# Patient Record
Sex: FEMALE | Employment: UNEMPLOYED | URBAN - METROPOLITAN AREA
[De-identification: names, ages, dates, MRNs, and addresses within clinical notes are randomized per-mention and may not be internally consistent; named-entity substitution may affect disease eponyms.]

---

## 2019-04-29 LAB
HBV SURFACE AG SERPL QL IA: NORMAL
HIV 1+2 AB+HIV1 P24 AG SERPL QL IA: NORMAL
RUBELLA ANTIBODY IGG QUANTITATIVE: NORMAL IU/ML

## 2019-10-11 LAB — GROUP B STREP PCR: NEGATIVE

## 2019-10-30 ENCOUNTER — ANESTHESIA (OUTPATIENT)
Dept: OBGYN | Facility: CLINIC | Age: 34
End: 2019-10-30
Payer: COMMERCIAL

## 2019-10-30 ENCOUNTER — HOSPITAL ENCOUNTER (INPATIENT)
Facility: CLINIC | Age: 34
LOS: 2 days | Discharge: HOME | End: 2019-11-01
Attending: OBSTETRICS & GYNECOLOGY | Admitting: OBSTETRICS & GYNECOLOGY
Payer: COMMERCIAL

## 2019-10-30 ENCOUNTER — ANESTHESIA EVENT (OUTPATIENT)
Dept: OBGYN | Facility: CLINIC | Age: 34
End: 2019-10-30
Payer: COMMERCIAL

## 2019-10-30 PROBLEM — Z36.89 ENCOUNTER FOR TRIAGE IN PREGNANT PATIENT: Status: ACTIVE | Noted: 2019-10-30

## 2019-10-30 LAB
ABO + RH BLD: ABNORMAL
BLD GP AB INVEST PLASRBC-IMP: ABNORMAL
BLD GP AB SCN SERPL QL: ABNORMAL
BLD GP AB SCN SERPL QL: ABNORMAL
BLD PROD TYP BPU: ABNORMAL
BLOOD BANK CMNT PATIENT-IMP: ABNORMAL
HGB BLD-MCNC: 11 G/DL (ref 11.7–15.7)
NUM BPU REQUESTED: 2
RUPTURE OF FETAL MEMBRANES BY ROM PLUS: POSITIVE
SPECIMEN EXP DATE BLD: ABNORMAL

## 2019-10-30 PROCEDURE — 0KQM0ZZ REPAIR PERINEUM MUSCLE, OPEN APPROACH: ICD-10-PCS | Performed by: OBSTETRICS & GYNECOLOGY

## 2019-10-30 PROCEDURE — 36415 COLL VENOUS BLD VENIPUNCTURE: CPT | Performed by: OBSTETRICS & GYNECOLOGY

## 2019-10-30 PROCEDURE — 86901 BLOOD TYPING SEROLOGIC RH(D): CPT | Performed by: OBSTETRICS & GYNECOLOGY

## 2019-10-30 PROCEDURE — 25000128 H RX IP 250 OP 636: Performed by: ANESTHESIOLOGY

## 2019-10-30 PROCEDURE — 12000000 ZZH R&B MED SURG/OB

## 2019-10-30 PROCEDURE — 72200001 ZZH LABOR CARE VAGINAL DELIVERY SINGLE

## 2019-10-30 PROCEDURE — 86780 TREPONEMA PALLIDUM: CPT | Performed by: OBSTETRICS & GYNECOLOGY

## 2019-10-30 PROCEDURE — 37000011 ZZH ANESTHESIA WARD SERVICE

## 2019-10-30 PROCEDURE — 25800030 ZZH RX IP 258 OP 636: Performed by: ANESTHESIOLOGY

## 2019-10-30 PROCEDURE — 86900 BLOOD TYPING SEROLOGIC ABO: CPT | Performed by: OBSTETRICS & GYNECOLOGY

## 2019-10-30 PROCEDURE — 86850 RBC ANTIBODY SCREEN: CPT | Performed by: OBSTETRICS & GYNECOLOGY

## 2019-10-30 PROCEDURE — 86922 COMPATIBILITY TEST ANTIGLOB: CPT | Performed by: OBSTETRICS & GYNECOLOGY

## 2019-10-30 PROCEDURE — 84112 EVAL AMNIOTIC FLUID PROTEIN: CPT | Performed by: OBSTETRICS & GYNECOLOGY

## 2019-10-30 PROCEDURE — G0463 HOSPITAL OUTPT CLINIC VISIT: HCPCS

## 2019-10-30 PROCEDURE — 3E0R3BZ INTRODUCTION OF ANESTHETIC AGENT INTO SPINAL CANAL, PERCUTANEOUS APPROACH: ICD-10-PCS | Performed by: ANESTHESIOLOGY

## 2019-10-30 PROCEDURE — 85018 HEMOGLOBIN: CPT | Performed by: OBSTETRICS & GYNECOLOGY

## 2019-10-30 PROCEDURE — 00HU33Z INSERTION OF INFUSION DEVICE INTO SPINAL CANAL, PERCUTANEOUS APPROACH: ICD-10-PCS | Performed by: ANESTHESIOLOGY

## 2019-10-30 PROCEDURE — 25800030 ZZH RX IP 258 OP 636: Performed by: OBSTETRICS & GYNECOLOGY

## 2019-10-30 PROCEDURE — 0UQJXZZ REPAIR CLITORIS, EXTERNAL APPROACH: ICD-10-PCS | Performed by: OBSTETRICS & GYNECOLOGY

## 2019-10-30 PROCEDURE — 40000671 ZZH STATISTIC ANESTHESIA CASE

## 2019-10-30 PROCEDURE — 25000125 ZZHC RX 250: Performed by: OBSTETRICS & GYNECOLOGY

## 2019-10-30 PROCEDURE — 86870 RBC ANTIBODY IDENTIFICATION: CPT | Performed by: OBSTETRICS & GYNECOLOGY

## 2019-10-30 RX ORDER — FERROUS SULFATE 325(65) MG
325 TABLET, DELAYED RELEASE (ENTERIC COATED) ORAL DAILY
Status: ON HOLD | COMMUNITY
End: 2019-11-01

## 2019-10-30 RX ORDER — METHYLERGONOVINE MALEATE 0.2 MG/ML
200 INJECTION INTRAVENOUS
Status: DISCONTINUED | OUTPATIENT
Start: 2019-10-30 | End: 2019-10-31

## 2019-10-30 RX ORDER — PRENATAL VIT/IRON FUM/FOLIC AC 27MG-0.8MG
1 TABLET ORAL DAILY
COMMUNITY

## 2019-10-30 RX ORDER — NALOXONE HYDROCHLORIDE 0.4 MG/ML
.1-.4 INJECTION, SOLUTION INTRAMUSCULAR; INTRAVENOUS; SUBCUTANEOUS
Status: DISCONTINUED | OUTPATIENT
Start: 2019-10-30 | End: 2019-10-31

## 2019-10-30 RX ORDER — NALBUPHINE HYDROCHLORIDE 10 MG/ML
2.5-5 INJECTION, SOLUTION INTRAMUSCULAR; INTRAVENOUS; SUBCUTANEOUS EVERY 6 HOURS PRN
Status: DISCONTINUED | OUTPATIENT
Start: 2019-10-30 | End: 2019-10-31

## 2019-10-30 RX ORDER — FENTANYL CITRATE 50 UG/ML
50-100 INJECTION, SOLUTION INTRAMUSCULAR; INTRAVENOUS
Status: DISCONTINUED | OUTPATIENT
Start: 2019-10-30 | End: 2019-10-31

## 2019-10-30 RX ORDER — PENICILLIN G POTASSIUM 5000000 [IU]/1
5 INJECTION, POWDER, FOR SOLUTION INTRAMUSCULAR; INTRAVENOUS ONCE
Status: DISCONTINUED | OUTPATIENT
Start: 2019-10-30 | End: 2019-10-30

## 2019-10-30 RX ORDER — ONDANSETRON 2 MG/ML
4 INJECTION INTRAMUSCULAR; INTRAVENOUS EVERY 6 HOURS PRN
Status: DISCONTINUED | OUTPATIENT
Start: 2019-10-30 | End: 2019-10-31

## 2019-10-30 RX ORDER — CARBOPROST TROMETHAMINE 250 UG/ML
250 INJECTION, SOLUTION INTRAMUSCULAR
Status: DISCONTINUED | OUTPATIENT
Start: 2019-10-30 | End: 2019-10-31

## 2019-10-30 RX ORDER — OXYTOCIN/0.9 % SODIUM CHLORIDE 30/500 ML
1-24 PLASTIC BAG, INJECTION (ML) INTRAVENOUS CONTINUOUS
Status: DISCONTINUED | OUTPATIENT
Start: 2019-10-30 | End: 2019-10-31

## 2019-10-30 RX ORDER — ACETAMINOPHEN 325 MG/1
650 TABLET ORAL EVERY 4 HOURS PRN
Status: DISCONTINUED | OUTPATIENT
Start: 2019-10-30 | End: 2019-10-31

## 2019-10-30 RX ORDER — IBUPROFEN 800 MG/1
800 TABLET, FILM COATED ORAL
Status: DISCONTINUED | OUTPATIENT
Start: 2019-10-30 | End: 2019-10-31

## 2019-10-30 RX ORDER — LIDOCAINE 40 MG/G
CREAM TOPICAL
Status: DISCONTINUED | OUTPATIENT
Start: 2019-10-30 | End: 2019-10-31

## 2019-10-30 RX ORDER — SODIUM CHLORIDE, SODIUM LACTATE, POTASSIUM CHLORIDE, CALCIUM CHLORIDE 600; 310; 30; 20 MG/100ML; MG/100ML; MG/100ML; MG/100ML
INJECTION, SOLUTION INTRAVENOUS CONTINUOUS
Status: DISCONTINUED | OUTPATIENT
Start: 2019-10-30 | End: 2019-10-31

## 2019-10-30 RX ORDER — OXYTOCIN/0.9 % SODIUM CHLORIDE 30/500 ML
100-340 PLASTIC BAG, INJECTION (ML) INTRAVENOUS CONTINUOUS PRN
Status: DISCONTINUED | OUTPATIENT
Start: 2019-10-30 | End: 2019-10-31

## 2019-10-30 RX ORDER — OXYTOCIN 10 [USP'U]/ML
10 INJECTION, SOLUTION INTRAMUSCULAR; INTRAVENOUS
Status: DISCONTINUED | OUTPATIENT
Start: 2019-10-30 | End: 2019-10-31

## 2019-10-30 RX ORDER — EPHEDRINE SULFATE 50 MG/ML
5 INJECTION, SOLUTION INTRAMUSCULAR; INTRAVENOUS; SUBCUTANEOUS
Status: DISCONTINUED | OUTPATIENT
Start: 2019-10-30 | End: 2019-10-31

## 2019-10-30 RX ORDER — OXYCODONE AND ACETAMINOPHEN 5; 325 MG/1; MG/1
1 TABLET ORAL
Status: DISCONTINUED | OUTPATIENT
Start: 2019-10-30 | End: 2019-10-31

## 2019-10-30 RX ADMIN — SODIUM CHLORIDE, POTASSIUM CHLORIDE, SODIUM LACTATE AND CALCIUM CHLORIDE: 600; 310; 30; 20 INJECTION, SOLUTION INTRAVENOUS at 18:57

## 2019-10-30 RX ADMIN — Medication 2 MILLI-UNITS/MIN: at 18:57

## 2019-10-30 RX ADMIN — FENTANYL CITRATE: 50 INJECTION INTRAVENOUS at 20:52

## 2019-10-30 SDOH — HEALTH STABILITY: MENTAL HEALTH: HOW OFTEN DO YOU HAVE A DRINK CONTAINING ALCOHOL?: NEVER

## 2019-10-30 ASSESSMENT — MIFFLIN-ST. JEOR: SCORE: 1281.95

## 2019-10-30 NOTE — PLAN OF CARE
Dr Hernandez updated of patient arrival--see previous note. FHTs category I with repositioning, no additional contractions noted. Orders to await ROM+ result, will encourage oral hydration. Will continue to monitor and update as needed.

## 2019-10-30 NOTE — PROVIDER NOTIFICATION
10/30/19 1732   Provider Notification   Provider Name/Title Dr Hernandez   Method of Notification Phone   Request Evaluate - Remote   Notification Reason Lab/Diagnostic Study;Status Update;SVE   ROM+ Positive, GBS negative. Prolonged SROM 10/29/19 @ 2100--clear fluid. SVE 2.5/70/-1 with [facility] 8. TORB for intrapartum orders with PCN for GBS treatment and pitocin augmentation. FHTs now Category I. Will continue to monitor and update as needed,

## 2019-10-31 LAB
BLD PROD TYP BPU: NORMAL
BLD UNIT ID BPU: 0
BLOOD PRODUCT CODE: NORMAL
BPU ID: NORMAL
HGB BLD-MCNC: 9.9 G/DL (ref 11.7–15.7)
T PALLIDUM AB SER QL: NONREACTIVE
TRANSFUSION STATUS PATIENT QL: NORMAL
TRANSFUSION STATUS PATIENT QL: NORMAL

## 2019-10-31 PROCEDURE — 36415 COLL VENOUS BLD VENIPUNCTURE: CPT | Performed by: OBSTETRICS & GYNECOLOGY

## 2019-10-31 PROCEDURE — 40000084 ZZH STATISTIC IP LACTATION SERVICES 16-30 MIN

## 2019-10-31 PROCEDURE — 85018 HEMOGLOBIN: CPT | Performed by: OBSTETRICS & GYNECOLOGY

## 2019-10-31 PROCEDURE — 12000000 ZZH R&B MED SURG/OB

## 2019-10-31 PROCEDURE — 25000132 ZZH RX MED GY IP 250 OP 250 PS 637: Performed by: OBSTETRICS & GYNECOLOGY

## 2019-10-31 RX ORDER — IBUPROFEN 800 MG/1
800 TABLET, FILM COATED ORAL EVERY 6 HOURS PRN
Status: DISCONTINUED | OUTPATIENT
Start: 2019-10-31 | End: 2019-11-01 | Stop reason: HOSPADM

## 2019-10-31 RX ORDER — BISACODYL 10 MG
10 SUPPOSITORY, RECTAL RECTAL DAILY PRN
Status: DISCONTINUED | OUTPATIENT
Start: 2019-11-01 | End: 2019-11-01 | Stop reason: HOSPADM

## 2019-10-31 RX ORDER — NALOXONE HYDROCHLORIDE 0.4 MG/ML
.1-.4 INJECTION, SOLUTION INTRAMUSCULAR; INTRAVENOUS; SUBCUTANEOUS
Status: DISCONTINUED | OUTPATIENT
Start: 2019-10-31 | End: 2019-11-01 | Stop reason: HOSPADM

## 2019-10-31 RX ORDER — LANOLIN 100 %
OINTMENT (GRAM) TOPICAL
Status: DISCONTINUED | OUTPATIENT
Start: 2019-10-31 | End: 2019-11-01 | Stop reason: HOSPADM

## 2019-10-31 RX ORDER — OXYTOCIN 10 [USP'U]/ML
10 INJECTION, SOLUTION INTRAMUSCULAR; INTRAVENOUS
Status: DISCONTINUED | OUTPATIENT
Start: 2019-10-31 | End: 2019-11-01 | Stop reason: HOSPADM

## 2019-10-31 RX ORDER — HYDROCORTISONE 2.5 %
CREAM (GRAM) TOPICAL 3 TIMES DAILY PRN
Status: DISCONTINUED | OUTPATIENT
Start: 2019-10-31 | End: 2019-11-01 | Stop reason: HOSPADM

## 2019-10-31 RX ORDER — ACETAMINOPHEN 325 MG/1
650 TABLET ORAL EVERY 4 HOURS PRN
Status: DISCONTINUED | OUTPATIENT
Start: 2019-10-31 | End: 2019-11-01 | Stop reason: HOSPADM

## 2019-10-31 RX ORDER — OXYTOCIN/0.9 % SODIUM CHLORIDE 30/500 ML
340 PLASTIC BAG, INJECTION (ML) INTRAVENOUS CONTINUOUS PRN
Status: DISCONTINUED | OUTPATIENT
Start: 2019-10-31 | End: 2019-11-01 | Stop reason: HOSPADM

## 2019-10-31 RX ORDER — DOCUSATE SODIUM 100 MG/1
100 CAPSULE, LIQUID FILLED ORAL DAILY PRN
Status: DISCONTINUED | OUTPATIENT
Start: 2019-10-31 | End: 2019-11-01 | Stop reason: HOSPADM

## 2019-10-31 RX ORDER — PRENATAL VIT/IRON FUM/FOLIC AC 27MG-0.8MG
1 TABLET ORAL DAILY
Status: DISCONTINUED | OUTPATIENT
Start: 2019-10-31 | End: 2019-11-01 | Stop reason: HOSPADM

## 2019-10-31 RX ORDER — OXYTOCIN/0.9 % SODIUM CHLORIDE 30/500 ML
100 PLASTIC BAG, INJECTION (ML) INTRAVENOUS CONTINUOUS
Status: DISCONTINUED | OUTPATIENT
Start: 2019-10-31 | End: 2019-11-01 | Stop reason: HOSPADM

## 2019-10-31 RX ADMIN — IBUPROFEN 800 MG: 800 TABLET ORAL at 15:24

## 2019-10-31 RX ADMIN — DOCUSATE SODIUM 100 MG: 100 CAPSULE, LIQUID FILLED ORAL at 09:05

## 2019-10-31 RX ADMIN — IBUPROFEN 800 MG: 800 TABLET ORAL at 00:58

## 2019-10-31 RX ADMIN — IBUPROFEN 800 MG: 800 TABLET ORAL at 09:05

## 2019-10-31 RX ADMIN — PRENATAL VIT W/ FE FUMARATE-FA TAB 27-0.8 MG 1 TABLET: 27-0.8 TAB at 09:05

## 2019-10-31 NOTE — ANESTHESIA PROCEDURE NOTES
Peripheral nerve/Neuraxial procedure note :         Assessment/Narrative  .  .  . Comments:  Pre-Procedure  Performed by Kevin Tucker MD  Location: OB.      PreAnesthestic Checklist: patient identified, IV checked, risks and benefits discussed, informed consent obtained, monitors and equipment checked, pre-op evaluation and at physician/surgeon's request.    Timeout   Correct Patient: Yes  Correct Procedure: Epidural catheter placement  Correct Site: Yes   Correct Position: Yes    Procedure Documentation  Procedure:   Epidural catheter block for Labor    Patient currently in labor and she and OBMD request a labor epidural to control her labor pains. Patient was interviewed and examined. Procedure and risks including but not limited to bleeding, infection, nerve injury, paralysis, PDPH, and inadequate block requiring intervention discussed with patient. Questions answered. This epidural is to be placed in anticipation of vaginal delivery.  She consents to the epidural procedure.  Time-out was performed.  I or my partners remain immediately available for management of any issues or complications and will monitor at appropriate intervals.  Procedure: Patient sitting. Betadine prep x 3. Sterile drape applied.  Lidocaine 1%  local infiltration at L 3-4.  17 G. Tuohy needle at L3-4 by loss of resistance into epidural space.  No CSF, paresthesia or blood. 1.5 % Lidocaine with 1:200,000 Epinephrine 5cc test dose. Then 0.25% bupivicaine 10 cc with NS 5 cc.  Epidural catheter inserted w/o resistance to 5 cm in epidural space.  Aspiration negative for blood and CSF.   Negative for neuro change, paresthesia or symptoms of intravascular injection or intrathecal injection.  Infusion orders written and infusion of 0.125% bupivicaine 15cc per hour started.    Kevin Tucker MD

## 2019-10-31 NOTE — PLAN OF CARE
Data: Vital signs within normal limits. Postpartum checks within normal limits - see flow record.  Up to bathroom independently and voiding adequate amounts.  Perineal laceration has minimal swelling and no signs of infection. Patient performing self cares and is able to care for infant.  IV site has no signs of infection, IV saline locked.    Action: Patient medicated during the shift for pain. See MAR. Patient reassessed within 1 hour after each medication and pain was improved - patient stated she was comfortable. Patient education done about pain management, breastfeeding, and normal postpartum findings. See flow record.    Response: Positive attachment behaviors observed with infant. Father of baby present and supportive.    Plan: Continue discharge planning.

## 2019-10-31 NOTE — ANESTHESIA POSTPROCEDURE EVALUATION
Patient: Misael Gonzalez    * No procedures listed *    Diagnosis:* No pre-op diagnosis entered *  Diagnosis Additional Information: labor    Anesthesia Type:  Epidural    Note:  Anesthesia Post Evaluation         Comments:     S/P epidural for labor.   I or my partner was immediately available for management of this patient during epidural analgesia infusion.  VSS.  Doing well. Block resolved.  Neuro at baseline. Denies positional headache. Minimal side effects easily managed w/ PRN meds. No apparent anesthetic complications. No follow-up required.    Donovan Medel MD        Last vitals:  Vitals:    10/30/19 2329 10/31/19 0100 10/31/19 0751   BP: 108/63 108/68 106/64   Pulse:  76 71   Resp:  16 16   Temp:  98.3  F (36.8  C) 98.2  F (36.8  C)         Electronically Signed By: Donovan Medel MD  October 31, 2019  8:31 AM

## 2019-10-31 NOTE — PROVIDER NOTIFICATION
10/30/19 2146   Provider Notification   Provider Name/Title Dr. Ibarra   Method of Notification Phone   Notification Reason Other (Comment)     Patient requesting to take warm shower. Baby has been Category 1. Order received to have patient off monitor.

## 2019-10-31 NOTE — LACTATION NOTE
LC visit and assist with positioning.  Her baby has been pinching her nipple when nursing due to shallow latching.  Compression marking and a flattened nipple was noted after infant delatched.  LC worked with deeper and asymmetric latching with nipple pointed upward.  HE was demonstrated and good volume noted with colostrum dripping down her breast.  Plan for cream and hydrogel as well as continued support with positioning.  Cross cradle hold on the left was used.

## 2019-10-31 NOTE — PROGRESS NOTES
[facility]   Post-partum Note    Name:  Misael Gonzalez  MRN: [id number]    S: Patient is doing well.  Pain is adequately controlled.  Tolerating regular diet without nausea or vomiting. Voiding spontaneously, passing flatus but no bowel movement as of yet.   Ambulating without dizziness.  Lochia similar to heavy menses.  Breast feeding. Desires condoms for contraception.  Plans discharge tomorrow.    O:   Patient Vitals for the past 24 hrs:   BP Temp Temp src Pulse Resp Height Weight   10/31/19 0751 106/64 98.2  F (36.8  C) Oral 71 16 -- --   10/31/19 0100 108/68 98.3  F (36.8  C) Oral 76 16 -- --   10/30/19 2329 108/63 -- -- -- -- -- --   10/30/19 2313 107/60 -- -- -- -- -- --   10/30/19 2258 115/64 -- -- -- -- -- --   10/30/19 2243 99/76 -- -- -- 16 -- --   10/30/19 2228 110/73 -- -- -- -- -- --   10/30/19 2213 115/65 -- -- -- -- -- --   10/30/19 2158 110/65 -- -- -- -- -- --   10/30/19 2143 112/63 -- -- -- -- -- --   10/30/19 2137 119/61 -- -- -- -- -- --   10/30/19 2133 116/58 -- -- -- -- -- --   10/30/19 2128 120/89 -- -- -- -- -- --   10/30/19 2126 -- -- -- -- 18 -- --   10/30/19 2123 115/73 -- -- -- -- -- --   10/30/19 2118 119/84 -- -- -- -- -- --   10/30/19 2114 -- -- -- -- 18 -- --   10/30/19 2112 105/76 -- -- -- -- -- --   10/30/19 2108 106/74 -- -- -- -- -- --   10/30/19 2100 97/60 -- -- -- 18 -- --   10/30/19 2057 99/61 -- -- -- -- -- --   10/30/19 2055 97/60 -- -- -- -- -- --   10/30/19 2054 -- 98.2  F (36.8  C) Oral -- 18 -- --   10/30/19 2053 108/62 -- -- -- -- -- --   10/30/19 2051 104/63 -- -- -- -- -- --   10/30/19 2049 102/60 -- -- -- -- -- --   10/30/19 2047 117/63 -- -- -- -- -- --   10/30/19 2033 -- 98.6  F (37  C) Oral -- 20 -- --   10/30/19 2015 -- -- -- -- 18 -- --   10/30/19 1945 -- 98.1  F (36.7  C) Oral -- 18 -- --   10/30/19 1857 101/69 98.4  F (36.9  C) Oral -- 16 -- --   10/30/19 1815 -- -- -- -- 16 -- --   10/30/19 1800 -- 98.4  F (36.9  C) Oral -- -- -- --   10/30/19  "1734 -- -- -- -- 17 -- --   10/30/19 1634 -- -- -- -- 17 -- --   10/30/19 1616 112/79 98.1  F (36.7  C) Oral 67 17 1.549 m (5' 1\") 64 kg (141 lb)     Gen:  Resting comfortably, NAD  CV:  Regular rate  Pulm:  Non-labored breathing.  No cough or wheezing.   Abd:  Soft, appropriately tender to palpation, non-distended.  Fundus at -1 umbilicus, firm and non-tender.  Ext:  Non-tender, trace LE edema b/l    Assessment/Plan:  34 year old now  who is PPD #1 s/p .  Continue with routine postpartum management.     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 Will need FM or IM referral PP.     Pain: Well-controlled with ibuprofen and tylenol  Hgb: 11.0>QBL 209cc. VSS as noted above, asymptomatic.   GI:  BID Senna/Colace.  PRN Simethicone.   PPx:  Encouraged ambulation   Rh: Positive   Rubella: Immune  Feed: Breast  BC: Condoms  Dispo: Plan for home on PPD#2.     Yarelis Victor MD   Pager: [phone]   2019  "

## 2019-10-31 NOTE — L&D DELIVERY NOTE
OB Vaginal Delivery Note    Misael Gonzalez MRN# [id number]   Age: 34 year old YOB: 1985       GA: 39w4d  GP:   Labor Complications: Fetal Intolerance;None   Delivery QBL:  209 mL  Delivery Type: Vaginal, Spontaneous   ROM to Delivery Time: (Delivered) Days: 1 Minutes: 26  [place] Weight: 3 kg (6 lb 9.8 oz)    1 Minute 5 Minute 10 Minute   Apgar Totals: 8    9          YOJANA RAMEY;AL DOLAN;BARI SONG     Delivery Details:  Misael Gonzalez, a 34 year old  female delivered a viable infant with apgars of 8   and 9  . Patient was fully dilated and pushing after 3  hours 7  minutes in active labor. Delivery was via vaginal, spontaneous  to a sterile field under epidural  anesthesia. Infant delivered in vertex  right  occiput  anterior  position. Anterior and posterior shoulders delivered without difficulty. The cord was clamped, cut twice and 3 vessels  were noted. Cord blood was obtained in routine fashion with the following disposition: lab .      Cord complications: none   Placenta delivered at 10/30/2019  9:31 PM . Placental disposition was Hospital disposal . Fundal massage performed and fundus found to be firm.     Episiotomy: none    Perineum, vagina, cervix were inspected, and the following lacerations were noted:   Perineal lacerations: 2nd and larisa-clitoral just below the clitoris.  The 2nd degree laceration were repaired in usual fashion with 3-0 Vicryl. The larisa-clitoral laceration was repaired with a running suture of 4-0 Vicryl.    Excellent hemostasis was noted. Needle count correct. Infant and patient in delivery room in good and stable condition.        Labor Event Times    Labor onset date:  10/30/19 Onset time:   6:00 PM   Dilation complete date:  10/30/19 Complete time:   9:07 PM   Start pushing date/time:  10/30/2019 2117      Labor Events     labor?:  No  Labor Type:  Augmentation, Spontaneous  Predominate monitoring during 1st stage:   continuous electronic fetal monitoring     Antibiotics received during labor?:  No     Rupture date/time: 10/29/19 2100   Rupture type:  Spontaneous rupture of membranes occuring during spontaneous labor or augmentation  Fluid color:  Clear  Fluid odor:  Normal     Augmentation:  Oxytocin  Indications for augmentation:  Prolonged ROM  1:1 continuous labor support provided by?:  RN       Delivery/Placenta Date and Time    Delivery Date:  10/30/19 Delivery Time:   9:26 PM   Placenta Date/Time:  10/30/2019  9:31 PM     Vaginal Counts     Initial count performed by 2 team members:   Two Team Members   Diana Crowe       Needles Suture [place] Sponges Instruments   Initial counts 2  5    Added to count  2     Final counts       Placed during labor Accounted for at the end of labor   No NA   No NA   No NA               Apgars    Living status:  Living   1 Minute 5 Minute 10 Minute 15 Minute 20 Minute   Skin color: 0  1       Heart rate: 2  2       Reflex irritability: 2  2       Muscle tone: 2  2       Respiratory effort: 2  2       Total: 8  9       Apgars assigned by:  DICK RAMEY RN     Cord     Complications:  None   Cord Blood Disposition:  Lab Gases Sent?:  Yes       Resuscitation    Methods:  None       Output in Delivery Room:  Stool     Skin to Skin and Feeding Plan    Skin to skin initiation date/time: 1/10/1841    Skin to skin with:  Mother  Skin to skin end date/time:     How do you plan to feed your baby:  Breastfeeding, Formula     Labor Events and Shoulder Dystocia    Fetal Tracing Prior to Delivery:  Category 2  Shoulder dystocia present?:  Neg             Delivery (Maternal) (Provider to Complete) ([id number])    Episiotomy:  None  Perineal lacerations:  2nd     Repaired?:  Yes      Blood Loss  Mother: Misael Gonzalez #[id number]   Start of Mother's Information    IO Blood Loss  10/30/19 1800 - 10/30/19 2152    None           End of Mother's Information  Mother: Misael Gonzalez  #[id number]         Delivery - Provider to Complete ([id number])    Delivering clinician:  Elena Omer MD  Attempted Delivery Types (Choose all that apply):  Spontaneous Vaginal Delivery  Delivery Type (Choose the 1 that will go to the Birth History):  Vaginal, Spontaneous   Other personnel:   Provider Role   Too Thibodeaux, RN Delivery Nurse   Diana King, RN Delivery Assist   Elena Omer Obstetrician         Placenta    Delayed Cord Clamping:  Done  Date/Time:  10/30/2019  9:31 PM  Removal:  Spontaneous  Comments:  discard per MD  Disposition:  Hospital disposal     Anesthesia    Method:  Epidural  Cervical dilation at placement:  0-3               Elena Omer MD

## 2019-10-31 NOTE — PLAN OF CARE
UAL. VSS. States she is voiding without difficulty. Reports tender nipples. Given mother love cream. Has taken Ibuprofen once this shift.  at bedside and supportive.   Both parents attentive to baby.

## 2019-10-31 NOTE — PLAN OF CARE
Data: Misael Gonzalez transferred to 426 via wheelchair at 0000. Baby transferred via parent's arms.  Action: Receiving unit notified of transfer: Yes. Patient and family notified of room change. Report given to Salo SCOTT RN at 0000. Belongings sent to receiving unit. Accompanied by Registered Nurse. Oriented patient to surroundings. Call light within reach. ID bands double-checked with receiving RN.  Response: Patient tolerated transfer and is stable.    Patients mobililty level scored using the bedside mobility assistance tool (BMAT). Patient is at a mobility level test number: 3. Mobility equipment used: clara stedy and wheelchair. Required assist of 2 staff members. Further use of BMAT scoring required.

## 2019-10-31 NOTE — H&P
[facility]    History and Physical  Obstetrics and Gynecology     Date of Admission:  10/30/2019    Assessment & Plan   Misael Gonzalez is a 34 year old female who presents with leakage of fluid per vagina since 9 pm last night,  ASSESSMENT:   IUP @ 39w4d ruptured with no labor.  NST reactive.  Category  I    PLAN:     Admit - see IP orders  Pain medication  As patient desires  Pitocin augmentation as rom over 20 hours  Anticipate     Holland Hernandez    History of Present Illness   Misael Gonzalez is a 34 year old female  39w4d  Estimated Date of Delivery: 19 is calculated from No LMP recorded. is [facility]  ruptured with no labor. Felt gush of fluid at 9 pm last night and leaking since then. rom test positive in triage.    PRENATAL COURSE  Prenatal course was essentially uncomplicated      Recent Labs   Lab Test 10/30/19  1809   ABO A  A   RH Pos  Pos   AS Pos*  Pos*     Rhogam not indicated   Recent Labs   Lab Test 10/11/19 04/29/19   HEPBANG  --  Non-reactive   HIAGAB  --  Non-reactive   GBS NEGATIVE  --    RUQIGG  --  Immune       Past Medical History    I have reviewed this patient's medical history and updated it with pertinent information if needed.   Past Medical History:   Diagnosis Date     Anemia        Past Surgical History   I have reviewed this patient's surgical history and updated it with pertinent information if needed.  History reviewed. No pertinent surgical history.    Prior to Admission Medications   Prior to Admission Medications   Prescriptions Last Dose Informant Patient Reported? Taking?   Prenatal Vit-Fe Fumarate-FA (PRENATAL MULTIVITAMIN W/IRON) 27-0.8 MG tablet 10/29/2019 at Unknown time  Yes Yes   Sig: Take 1 tablet by mouth daily   ferrous sulfate (FE TABS) 325 (65 Fe) MG EC tablet 10/29/2019 at Unknown time  Yes Yes   Sig: Take 325 mg by mouth daily      Facility-Administered Medications: None     Allergies   Allergies   Allergen Reactions      Blood Transfusion Related (Informational Only) Other (See Comments)     Patient has a history of a clinically significant antibody against RBC antigens.  A delay in compatible RBCs may occur.       Social History   I have reviewed this patient's social history and updated it with pertinent information if needed. Misael Gonzalez  reports that she has never smoked. She has never used smokeless tobacco. She reports that she does not drink alcohol or use drugs.    Family History   I have reviewed this patient's family history and updated it with pertinent information if needed.   History reviewed. No pertinent family history.    Immunization History   Immunizations are up to date    Physical Exam   Temp: 98.3  F (36.8  C) Temp src: Oral BP: 108/68 Pulse: 76   Resp: 16        Vital Signs with Ranges  Temp:  [98.1  F (36.7  C)-98.6  F (37  C)] 98.3  F (36.8  C)  Pulse:  [67-76] 76  Resp:  [16-20] 16  BP: ()/(58-89) 108/68    Abdomen: gravid, single vertex fetus, non-tender,   Cervical Exam: 2.5/ 80/ Posterior/ average/ -2     Fetal Heart Tones: 145 baseline, moderate variablility, + accels, no decels and Category I  TOCO:   occasional    Constitutional: healthy, alert, active and no distress   Respiratory: No increased work of breathing, good air exchange, clear to auscultation bilaterally, no crackles or wheezing  Cardiovascular: Normal apical impulse, regular rate and rhythm, normal S1 and S2, no S3 or S4, and no murmur noted  Skin/Extremites: no bruising or bleeding and normal skin color, texture, turgor  Neuropsychiatric: General: normal, calm and normal eye contact  Level of consciousness: alert / normal  Affect: normal  Orientation: oriented to self, place, time and situation  Memory and insight: normal, memory for past and recent events intact and thought process normal

## 2019-10-31 NOTE — ANESTHESIA PREPROCEDURE EVALUATION
"Anesthesia Pre-Procedure Evaluation    Patient: Misael Gonzalez   MRN: [id number] : 1985          Preoperative Diagnosis: * No surgery found *        Past Medical History:   Diagnosis Date     Anemia      History reviewed. No pertinent surgical history.  Anesthesia Evaluation       history and physical reviewed .             ROS/MED HX    ENT/Pulmonary:  - neg pulmonary ROS     Neurologic:  - neg neurologic ROS     Cardiovascular:  - neg cardiovascular ROS       METS/Exercise Tolerance:     Hematologic:         Musculoskeletal:         GI/Hepatic:  - neg GI/hepatic ROS       Renal/Genitourinary:         Endo:         Psychiatric:         Infectious Disease:         Malignancy:         Other:                     neg OB ROS            Physical Exam  Normal systems: cardiovascular, pulmonary and dental    Airway   Mallampati: II  TM distance: > 3 FB  Neck ROM: full  Mouth opening: > 3 cm    Dental     Cardiovascular       Pulmonary             Lab Results   Component Value Date    HGB 11.0 (L) 10/30/2019       Preop Vitals  BP Readings from Last 3 Encounters:   10/30/19 101/69    Pulse Readings from Last 3 Encounters:   10/30/19 67      Resp Readings from Last 3 Encounters:   10/30/19 18    SpO2 Readings from Last 3 Encounters:   No data found for SpO2      Temp Readings from Last 1 Encounters:   10/30/19 98.1  F (36.7  C) (Oral)    Ht Readings from Last 1 Encounters:   10/30/19 1.549 m (5' 1\")      Wt Readings from Last 1 Encounters:   10/30/19 64 kg (141 lb)    Estimated body mass index is 26.64 kg/m  as calculated from the following:    Height as of this encounter: 1.549 m (5' 1\").    Weight as of this encounter: 64 kg (141 lb).       Anesthesia Plan      History & Physical Review  History and physical reviewed and following examination; no interval change.    ASA Status:  2 .  OB Epidural Asa: 2   NPO Status:  > 8 hours    Plan for Epidural          Postoperative Care      Consents  Anesthetic plan, risks, " benefits and alternatives discussed with:  Patient..                 Kevin Tucker MD                    .

## 2019-11-01 VITALS
RESPIRATION RATE: 16 BRPM | BODY MASS INDEX: 26.62 KG/M2 | SYSTOLIC BLOOD PRESSURE: 111 MMHG | HEART RATE: 78 BPM | DIASTOLIC BLOOD PRESSURE: 69 MMHG | HEIGHT: 61 IN | WEIGHT: 141 LBS | TEMPERATURE: 98.1 F

## 2019-11-01 PROCEDURE — 25000132 ZZH RX MED GY IP 250 OP 250 PS 637: Performed by: OBSTETRICS & GYNECOLOGY

## 2019-11-01 PROCEDURE — 40000083 ZZH STATISTIC IP LACTATION SERVICES 1-15 MIN

## 2019-11-01 RX ORDER — IBUPROFEN 600 MG/1
600 TABLET, FILM COATED ORAL EVERY 6 HOURS PRN
Qty: 60 TABLET | Refills: 0 | Status: SHIPPED | OUTPATIENT
Start: 2019-11-01

## 2019-11-01 RX ADMIN — IBUPROFEN 800 MG: 800 TABLET ORAL at 06:34

## 2019-11-01 RX ADMIN — IBUPROFEN 800 MG: 800 TABLET ORAL at 14:45

## 2019-11-01 RX ADMIN — PRENATAL VIT W/ FE FUMARATE-FA TAB 27-0.8 MG 1 TABLET: 27-0.8 TAB at 09:55

## 2019-11-01 RX ADMIN — IBUPROFEN 800 MG: 800 TABLET ORAL at 00:07

## 2019-11-01 NOTE — LACTATION NOTE
Lactation visit. Assisted with positioning for more comfortable nursing. Baby is otherwise doing well at the breast. Educated in breast compression and hand expression pc. Encouraged mom to call us PRN.

## 2019-11-01 NOTE — PROGRESS NOTES
Public Health Nurse (PHN) met with patient, discussed resources within [facility].  Provided family resources of [facility] services resource card, home visiting card, Follow along card, community resource guide and car seat information card given and discussed.  Patient is aware how to add baby to insurance and has a primary provider arranged for baby.  Offered referral for home visiting, patient replied no to referral. Patient reports no questions or concerns at this time.

## 2019-11-01 NOTE — DISCHARGE SUMMARY
[facility] Discharge Summary    Misael Gonzalez MRN# [id number]   Age: 34 year old YOB: 1985     Date of Admission:  10/30/2019  Date of Discharge::  2019  Admitting Physician:  Elena Omer MD  Discharge Physician:  Jeff Villar     [place] clinic: [facility]          Admission Diagnoses:    PREGNANCY  Encounter for triage in pregnant patient  Indication for care in labor or delivery   (spontaneous vaginal delivery)          Discharge Diagnosis:   Normal spontaneous vaginal delivery  Intrauterine pregnancy at 39 weeks gestation          Procedures:   Procedure(s): No additional procedures performed                  Medications Prior to Admission:     Medications Prior to Admission   Medication Sig Dispense Refill Last Dose     ferrous sulfate (FE TABS) 325 (65 Fe) MG EC tablet Take 325 mg by mouth daily   10/29/2019 at Unknown time     Prenatal Vit-Fe Fumarate-FA (PRENATAL MULTIVITAMIN W/IRON) 27-0.8 MG tablet Take 1 tablet by mouth daily   10/29/2019 at Unknown time             Discharge Medications:   No medications were prescribed on discharge          Consultations:   No consultations were requested during this admission          Brief History of Labor:   Misael Gonzalez, a 34 year old  female delivered a viable infant with apgars of 8   and 9  . Patient was fully dilated and pushing after 3  hours 7  minutes in active labor. Delivery was via vaginal, spontaneous  to a sterile field under epidural  anesthesia. Infant delivered in vertex  right  occiput  anterior  position. Anterior and posterior shoulders delivered without difficulty. The cord was clamped, cut twice and 3 vessels  were noted. Cord blood was obtained in routine fashion with the following disposition: lab .       Cord complications: none   Placenta delivered at 10/30/2019  9:31 PM . Placental disposition was Hospital disposal . Fundal massage performed and fundus found to be firm.      Episiotomy: none     Perineum, vagina, cervix were inspected, and the following lacerations were noted:   Perineal lacerations: 2nd and larisa-clitoral just below the clitoris.  The 2nd degree laceration were repaired in usual fashion with 3-0 Vicryl. The larisa-clitoral laceration was repaired with a running suture of 4-0 Vicryl.     Excellent hemostasis was noted. Needle count correct. Infant and patient in delivery room in good and stable condition.            Hospital Course:   The patient's hospital course was unremarkable.  On discharge, her pain was well controlled. Vaginal bleeding is similar to peak menstrual flow.  Voiding without difficulty.  Ambulating well and tolerating a normal diet.  No fever.  Breastfeeding well.  Infant is stable.  No bowel movement yet.*  She was discharged on post-partum day #2.    Post-partum hemoglobin:   Hemoglobin   Date Value Ref Range Status   10/31/2019 9.9 (L) 11.7 - 15.7 g/dL Final             Discharge Instructions and Follow-Up:   Discharge diet: Regular   Discharge activity: Activity as tolerated   Discharge follow-up: Follow up with primary care provider in 6 weeks   Wound care:            Discharge Disposition:   Discharged to home      Attestation:  I have reviewed today's vital signs, notes, medications, labs and imaging.    Jeff Villar

## 2019-11-01 NOTE — PROGRESS NOTES
[facility] Obstetrics Post-Partum Progress Note          Assessment and Plan:    Assessment:   Post-partum day #2  Normal spontaneous vaginal delivery  L&D complications: None      Doing well.  No excessive bleeding      Plan:   Ambulation encouraged           Interval History:   Doing well.  Pain is adequately controlled.  No fevers.  No history of foul-smelling vaginal discharge.  Good appetite.  Denies chest pain, shortness of breath, nausea or vomiting.  Vaginal bleeding is similar to a heavy menstrual flow.  Breastfeeding well.          Significant Problems:    None          Review of Systems:    The patient denies any chest pain, shortness of breath, excessive pain, fever, chills, purulent drainage from the wound, nausea or vomiting.          Medications:   All medications have been reviewed          Physical Exam:   All vitals stable  Uterine fundus is firm, non-tender and at the level of the umbilicus          Data:   All laboratory data  reviewed      Jeff Villar

## 2019-11-01 NOTE — PLAN OF CARE
VSS. Voiding without difficulty. Up ad junito. Pain managed with ibuprofen. Tucks given for larisa-clitoral tear. Breastfeeding independently. Bonding appropriately with infant and attentive to cares.  supportive at bedside.

## 2019-11-01 NOTE — DISCHARGE INSTRUCTIONS
Postpartum Vaginal Delivery Instructions  Return to clinic in 6 weeks for follow-up  Lactation [phone]    Activity       Ask family and friends for help when you need it.    Do not place anything in your vagina for 6 weeks.    You are not restricted on other activities, but take it easy for a few weeks to allow your body to recover from delivery.  You are able to do any activities you feel up to that point.    No driving until you have stopped taking your pain medications (usually two weeks after delivery).     Call your health care provider if you have any of these symptoms:       Increased pain, swelling, redness, or fluid around your stiches from an episiotomy or perineal tear.    A fever above 100.4 F (38 C) with or without chills when placing a thermometer under your tongue.    You soak a sanitary pad with blood within 1 hour, or you see blood clots larger than a golf ball.    Bleeding that lasts more than 6 weeks.    Vaginal discharge that smells bad.    Severe pain, cramping or tenderness in your lower belly area.    A need to urinate more frequently (use the toilet more often), more urgently (use the toilet very quickly), or it burns when you urinate.    Nausea and vomiting.    Redness, swelling or pain around a vein in your leg.    Problems breastfeeding or a red or painful area on your breast.    Chest pain and cough or are gasping for air.    Problems coping with sadness, anxiety, or depression.  If you have any concerns about hurting yourself or the baby, call your provider immediately.     You have questions or concerns after you return home.     Keep your hands clean:  Always wash your hands before touching your perineal area and stitches.  This helps reduce your risk of infection.  If your hands aren't dirty, you may use an alcohol hand-rub to clean your hands. Keep your nails clean and short.

## 2019-11-01 NOTE — PLAN OF CARE
Data: Vital signs within normal limits. Postpartum checks within normal limits - see flow record. Patient eating and drinking normally. Patient able to empty bladder independently and is up ambulating. No apparent signs of infection. Patient performing self cares and is able to care for infant.  Action: Patient medicated during the shift with ibuprofen for cramping See MAR. Patient reassessed within 1 hour after each medication and pain was improved - patient stated she was comfortable. Patient education done completed. See flow record.  Response: Positive attachment behaviors observed with infant. Father of infant present and attentive to both mother and infant needs.  Plan: Discharged to home today at 15:48 with all patient belongings. AVS read to patient. All questions and concerns addressed.

## 2019-11-01 NOTE — PLAN OF CARE
Vitals stable, fundus firm. Breastfeeding well after encouragement and teaching. Up independently. Good appetite. Family visiting.  present and supportive.

## 2019-11-03 LAB
BLD PROD TYP BPU: NORMAL
BLD PROD TYP BPU: NORMAL
BLD UNIT ID BPU: 0
BLD UNIT ID BPU: 0
BLOOD PRODUCT CODE: NORMAL
BLOOD PRODUCT CODE: NORMAL
BPU ID: NORMAL
BPU ID: NORMAL
TRANSFUSION STATUS PATIENT QL: NORMAL

## 2021-01-03 ENCOUNTER — HEALTH MAINTENANCE LETTER (OUTPATIENT)
Age: 36
End: 2021-01-03

## 2021-10-10 ENCOUNTER — HEALTH MAINTENANCE LETTER (OUTPATIENT)
Age: 36
End: 2021-10-10

## 2022-01-30 ENCOUNTER — HEALTH MAINTENANCE LETTER (OUTPATIENT)
Age: 37
End: 2022-01-30

## 2022-09-18 ENCOUNTER — HEALTH MAINTENANCE LETTER (OUTPATIENT)
Age: 37
End: 2022-09-18

## 2023-05-07 ENCOUNTER — HEALTH MAINTENANCE LETTER (OUTPATIENT)
Age: 38
End: 2023-05-07